# Patient Record
Sex: MALE | Race: WHITE
[De-identification: names, ages, dates, MRNs, and addresses within clinical notes are randomized per-mention and may not be internally consistent; named-entity substitution may affect disease eponyms.]

---

## 2018-10-02 ENCOUNTER — HOSPITAL ENCOUNTER (OUTPATIENT)
Dept: HOSPITAL 75 - PREOP | Age: 38
Discharge: HOME | End: 2018-10-02
Attending: SURGERY
Payer: COMMERCIAL

## 2018-10-02 VITALS — BODY MASS INDEX: 35.78 KG/M2 | WEIGHT: 270 LBS | HEIGHT: 73 IN

## 2018-10-02 DIAGNOSIS — Z01.818: Primary | ICD-10-CM

## 2018-10-04 ENCOUNTER — HOSPITAL ENCOUNTER (OUTPATIENT)
Dept: HOSPITAL 75 - SDC | Age: 38
Discharge: HOME | End: 2018-10-04
Attending: SURGERY
Payer: COMMERCIAL

## 2018-10-04 VITALS — HEIGHT: 73 IN | BODY MASS INDEX: 35.78 KG/M2 | WEIGHT: 270 LBS

## 2018-10-04 VITALS — DIASTOLIC BLOOD PRESSURE: 75 MMHG | SYSTOLIC BLOOD PRESSURE: 120 MMHG

## 2018-10-04 VITALS — DIASTOLIC BLOOD PRESSURE: 89 MMHG | SYSTOLIC BLOOD PRESSURE: 133 MMHG

## 2018-10-04 VITALS — DIASTOLIC BLOOD PRESSURE: 90 MMHG | SYSTOLIC BLOOD PRESSURE: 134 MMHG

## 2018-10-04 VITALS — DIASTOLIC BLOOD PRESSURE: 83 MMHG | SYSTOLIC BLOOD PRESSURE: 144 MMHG

## 2018-10-04 DIAGNOSIS — Z11.2: ICD-10-CM

## 2018-10-04 DIAGNOSIS — Z79.899: ICD-10-CM

## 2018-10-04 DIAGNOSIS — E66.9: ICD-10-CM

## 2018-10-04 DIAGNOSIS — K43.0: Primary | ICD-10-CM

## 2018-10-04 DIAGNOSIS — F32.9: ICD-10-CM

## 2018-10-04 DIAGNOSIS — F41.9: ICD-10-CM

## 2018-10-04 PROCEDURE — 87081 CULTURE SCREEN ONLY: CPT

## 2018-10-04 PROCEDURE — 88305 TISSUE EXAM BY PATHOLOGIST: CPT

## 2018-10-04 PROCEDURE — 94664 DEMO&/EVAL PT USE INHALER: CPT

## 2018-10-04 RX ADMIN — SODIUM CHLORIDE, SODIUM LACTATE, POTASSIUM CHLORIDE, AND CALCIUM CHLORIDE PRN MLS/HR: 600; 310; 30; 20 INJECTION, SOLUTION INTRAVENOUS at 10:11

## 2018-10-04 RX ADMIN — SODIUM CHLORIDE, SODIUM LACTATE, POTASSIUM CHLORIDE, AND CALCIUM CHLORIDE PRN MLS/HR: 600; 310; 30; 20 INJECTION, SOLUTION INTRAVENOUS at 07:25

## 2018-10-04 NOTE — OPERATIVE REPORT
DATE OF SERVICE:  10/04/2018



ATTENDING PRIMARY CLINICIAN:

STEPHANY Martinez



PREOPERATIVE DIAGNOSIS:

Symptomatic incarcerated ventral abdominal incisional hernia.



POSTOPERATIVE DIAGNOSES:

Symptomatic incarcerated ventral abdominal incisional hernia, hernia defect 3 x

3 cm in size with omentum within the hernia sac.



PROCEDURE:

Open repair of incarcerated ventral abdominal incisional hernia with mesh.



SURGEON:

Arianne Salamanca MD



ASSISTANT:

STEPHANY Coker.



ANESTHESIA:

General endotracheal.



ESTIMATED BLOOD LOSS:

Minimal.



FINDINGS:

Incisional hernia identified with a fascial defect approximately 3 x 3 cm in

size.  There was a significant size hernia sac with only omentum found within

the hernia sac.



DISPOSITION:

The patient tolerated the procedure well.



INDICATIONS:

The patient is a 38-year-old male who was referred over to us for pain and a

bulge in the supraumbilical region.  He first noticed this 2 weeks ago; however,

the lesion had grown larger in size and become much more painful.  He had a

hernia repaired in the same region, which appears to be a primary umbilical

hernia approximately in 2008.  Upon examination, he was found to have an

incarcerated incisional hernia, however, no signs of strangulation.



DESCRIPTION OF PROCEDURE:

The patient was brought to the operating room, laid supine on the table.  After

adequate IV pain and sedative medications and general endotracheal intubation,

the abdomen was prepped and draped in standard surgical fashion.



A 0.5% Marcaine with epinephrine was used to anesthetize the overlying skin

along the previous vertical incision supraumbilically.  A vertical skin incision

was made using 15 blade.  The subcutaneous tissue was then dissected down using

electrocautery.  The hernia sac was identified and completely dissected out

using electrocautery as well as Metzenbaum scissors.  The fascial defect was

identified and the area of fascia was cleared off using electrocautery as well

as blunt dissection.  The hernia sac was then opened using Metzenbaum scissors. 

There was only incarcerated omentum within the hernia sac.  This was a

significant portion of incarcerated omentum with a fascial opening significantly

smaller and approximately 3 x 3 cm.  We then proceeded with excision of the

entire hernia sac as well as the incarcerated omentum using electrocautery with

visualization of good hemostasis.



We then inspected the remainder of the fascia from the underside which was with

no other hernia defects identified as well as no adhesions.  An 8 cm coated

polypropylene mesh was then placed into the defect and sutured to the fascial

edge in a concentric manner using 3-0 Prolene sutures.  Good hemostasis was

observed.  The subcutaneous tissue was then reapproximated using 3-0 Vicryl

interrupted sutures.  Skin was closed using 4-0 Monocryl running subcuticular

suture.  Wound was then cleaned and covered with Dermabond.  The wound was then

covered with tonsil sponges followed by 4 x 4 gauze followed by a large Op-Site

and an abdominal binder.



The patient tolerated the procedure well.  We will start IV and oral pain

medications as well as a clear liquid diet.  Once he is tolerating clears and

has good pain control with oral pain medications, ambulating well, we will

discharge him home.  We will also instruct him to do no heavy lifting or

exertion for the next 2 weeks and then more in a gradual stepwise fashion,

increase activity until 6 weeks from the surgery date.





Job ID: [id number]

DocumentID: [id number]

Dictated Date:  10/04/2018 10:39:13

Transcription Date: 10/04/2018 14:08:33

Dictated By: ARIANNE SALAMANCA MD

## 2018-10-04 NOTE — XMS REPORT
Continuity of Care Document

 Created on: 10/04/2018



GIOVANNY SALAMANCA

External Reference #: [id number]

: 1980

Sex: Male



Demographics







 Address  [address]

[place]  [address]

 

 Home Phone  [phone] x

 

 Preferred Language  Unknown

 

 Marital Status  Unknown

 

 Yazdanism Affiliation  Unknown

 

 Race  Unknown

 

 Ethnic Group  Unknown





Author







 Author  [facility]

 

 Address  Unknown

 

 Phone  Unavailable



              



Allergies

      



There is no data.                  



Medications

      



There is no data.                  



Problems

      





 Date Dx Coded            Attending            Type            Code            
Diagnosis            Diagnosed By        

 

 2013            ЕЛЕНА BARRY T                         296.90     
       MOOD DISORDER                     

 

 2013            GONZALO HAMMOND ЕЛЕНА T                         726.90     
       TENDONITIS                     

 

 2013            ЕЛЕНА BARRY T                         296.90     
       MOOD DISORDER                     

 

 2013            GONZALO HAMMOND ЕЛЕНА T                         726.90     
       TENDONITIS                     

 

 2013            ЕЛЕНА BARRY T                         296.90     
       MOOD DISORDER                     

 

 2013            GONZALO HAMMOND ЕЛЕНА T                         726.90     
       TENDONITIS                     

 

 2013            ЕЛЕНА BARRY T                         296.90     
       MOOD DISORDER                     

 

 2013            GONZALO HAMMOND ЕЛЕНА T                         726.90     
       TENDONITIS                     

 

 2013            GONZALO HAMMOND ЕЛЕНА T                         296.90     
       MOOD DISORDER                     

 

 2013            GONZALO HAMMOND ЕЛЕНА T                         726.90     
       TENDONITIS                     

 

 2013            GONZALO HAMMOND ЕЛЕНА T                         296.90     
       MOOD DISORDER                     

 

 2013            GONZALO HAMMOND ЕЛЕНА T                         726.90     
       TENDONITIS                     

 

 2013            GONZALO HAMMOND ЕЛЕНА T                         296.90     
       MOOD DISORDER                     

 

 2013            ЕЛЕНА BARRY T                         726.90     
       TENDONITIS                     

 

 2013            GONZALO HAMMOND ЕЛЕНА T                         296.90     
       MOOD DISORDER                     

 

 2013            GONZALO HAMMOND ЕЛЕНА T                         726.90     
       TENDONITIS                     

 

 2013            AIDEN ORONA PHD                         296.90   
         MOOD DISORDER                     

 

 2013            AIDEN ORONA PHD                         726.90   
         TENDONITIS                     

 

 2013            ALICIA MONTOYA MD                         296.90        
    MOOD DISORDER                     

 

 2013            ALICIA MONTOYA MD                         726.90        
    TENDONITIS                     

 

 2013            LYNDON YANG MD                         296.90      
      MOOD DISORDER                     

 

 2013            LYNDON YNAG MD                         726.90      
      TENDONITIS                     

 

 2014            ЕЛЕНА BARRY T                         303.90     
       ALCOHOLISM                     

 

 2014            ЕЛЕНА BARRY                         303.90     
       ALCOHOLISM                     

 

 2014            ЕЛЕНА BARRY                         303.90     
       ALCOHOLISM                     

 

 2014            ЕЛЕНА BARRY                         303.90     
       ALCOHOLISM                     

 

 2014            ЕЛЕНА BARRY                         303.90     
       ALCOHOLISM                     

 

 2014            ADWOA BUCKNER, AIDEN ATKINSON                         303.90   
         ALCOHOLISM                     

 

 2014            ALICIA MONTOYA MD                         303.90        
    ALCOHOLISM                     

 

 2014            LYNDON YANG MD                         303.90      
      ALCOHOLISM                     

 

 2014            ЕЛЕНА BARRY                         780.57     
       SLEEP APNEA                     

 

 2014            ЕЛЕНА BARRY                         780.57     
       SLEEP APNEA                     

 

 2014            ADWOA PHD, AIDEN ATKINSON                         780.57   
         SLEEP APNEA                     

 

 2014            ALICIA MONTOYA MD                         780.57        
    SLEEP APNEA                     

 

 2014            LYNDON YANG MD                         780.57      
      SLEEP APNEA                     

 

 10/02/2014            ADWOA PHD, AIDEN ATKINSON                         300.4    
        MO DYSTHYMIC DISORDER                     

 

 10/02/2014            ADWOA PHD, AIDEN ATKINSON                         307.47   
         SI DYSSOMNIA NOS                     

 

 10/02/2014            ALICIA MONTOYA MD                         300.4         
   MO DYSTHYMIC DISORDER                     

 

 10/02/2014            ALICIA MONTOYA MD                         307.47        
    SI DYSSOMNIA NOS                     

 

 10/02/2014            LYNDON YANG MD                         300.4       
     MO DYSTHYMIC DISORDER                     

 

 10/02/2014            LYNDON YANG MD                         307.47      
      SI DYSSOMNIA NOS                     

 

 2014            ALICIA MONTOYA MD                         578.1         
   BLOOD IN STOOL                     

 

 2014            LYNDON YANG MD                         578.1       
     BLOOD IN STOOL                     

 

 01/15/2015            LYNDON YANG MD                         848.9       
     UNSPECIFIED SITE OF SPRAIN AND STRAIN                     



                                                                               
                         



Procedures

      





 Code            Description            Performed By            Performed On   
     

 

             98336                                  TSH                        
           2013        

 

             48776                                  ROUTINE VENIPUNCTURE       
                            2013        

 

             [id number]                                  CMP                        
           2013        

 

             19237                                  LIPID PANEL                
                   2013        

 

             [id number]                                  GFR CALC (RESULT ONLY)   
                                2013        

 

             [id number]                                  CBC                        
           2013        

 

             [id number]                                  SLEEP STUDY (HOSPITAL- 
SLEEP STUDY)                                   2014        

 

             [id number]                                  PSYCH DIAGNOSTIC EVALUATION
                                   10/02/2014        



                                



Results

      



There is no data.              



Encounters

      





 ACCT No.            Visit Date/Time            Discharge            Status    
        Pt. Type            Provider            Facility            Loc./Unit  
          Complaint        

 

 [id number]            01/15/2015 11:47:00            01/15/2015 23:59:59          
  [facility]            Outpatient            CELIA FLORES, LYNDON MONTES                     
                          

 

 [id number]            2014 16:35:00            2014 23:59:59          
  [facility]            Outpatient            ALICIA MONTOYA MD                       
                        

 

 [id number]            10/02/2014 08:01:00            10/02/2014 23:59:59          
  [facility]            Outpatient            ADWOA BUCKNER, AIDEN ATKINSON                  
                             

 

 [id number]            2014 12:00:00            2014 23:59:59          
  [facility]            Outpatient            [facility]                    
                           

 

 [id number]            2014 12:14:00            2014 23:59:59          
  [facility]            Outpatient            [facility]                    
                           

 

 [id number]            2014 11:39:00            2014 23:59:59          
  [facility]            Outpatient            [facility]                    
                           

 

 [id number]            2014 12:08:00            2014 23:59:59          
  [facility]            Outpatient            [facility]                    
                           

 

 [id number]            2014 10:58:00            2014 23:59:59          
  [facility]            Outpatient            [facility]                    
                           

 

 [id number]            2014 09:50:00            2014 23:59:59          
  [facility]            Outpatient            [facility]                    
                           

 

 [id number]            2013 13:53:00            2013 23:59:59          
  [facility]            Outpatient            [facility]                    
                           

 

 [id number]            2013 08:16:00            2013 23:59:59          
  [facility]            Outpatient            [facility]                    
                           

 

 [id number]            2013 18:03:25                         [facility]            
Document Registration

## 2018-10-04 NOTE — PROGRESS NOTE-POST OPERATIVE
Post-Operative Progess Note


Surgeon (s)/Assistant (s)


Surgeon


ARIANNE SALAMANCA MD


Assistant:  vidya alatorre APRN





Pre-Operative Diagnosis


Incarcerated ventral abdominal incisional hernia





Post-Operative Diagnosis





same(3x3cm) with omentum in hernia sac





Procedure & Operative Findings


Date of Procedure


10/4/18


Procedure Performed/Findings


open repair incarcerated  ventral abdominal incisional hernia with mesh.


Anesthesia Type


GET





Estimated Blood Loss


Estimated blood loss (mL):  minimal





Specimens/Packing


Specimens Removed


hernia sac and omental contents.











ARIANNE SALAMANCA MD Oct 4, 2018 10:27 am

## 2018-10-04 NOTE — DISCHARGE INST-SURGICAL
D/C Lap Instructions-KIDO


New, Converted, or Re-Newed RX:  RX on Chart


Follow Up Appt in 2 weeks





Activity as tolerated


No driving for 24 hours


No driving while on pain medications


No heavy lifting/exertion for 6 weeks.





Incentive Spirometry use every 2 hours while awake





Regular Diet





Symptoms to Report: Fever over 101 degree F, Nausea/Vomiting 


Infection Signs and Symptoms to report:  Increased redness, Foul odor of wound, 

Increased drainage





Bathing instructions: May shower


Operative Area Clean/Dry;  Keep incision clean/dry





If any problems/questions: Contact your physician or go to Emergency Room











JAYY MOJICA Oct 4, 2018 8:14 am

## 2018-10-04 NOTE — PROGRESS NOTE-PRE OPERATIVE
Pre-Operative Progress Note


H&P Reviewed


The H&P was reviewed, patient examined and no changes noted.


Date Seen by Provider:  Oct 4, 2018


Time Seen by Provider:  07:55


Date H&P Reviewed:  Oct 4, 2018


Time H&P Reviewed:  08:00


Pre-Operative Diagnosis:  Incarcerated recurrent ventral abdominal incisional 

hernia











JAYY MOJICA Oct 4, 2018 8:09 am

## 2018-10-04 NOTE — ANESTHESIA-GENERAL POST-OP
General


Patient Condition


Mental Status/LOC:  Same as Preop


Cardiovascular:  Satisfactory


Nausea/Vomiting:  Absent


Respiratory:  Satisfactory


Pain:  Controlled


Complications:  Absent





Post Op Complications


Complications


None





Follow Up Care/Instructions


Patient Instructions


None needed.





Anesthesia/Patient Condition


Patient Condition


Patient is doing well, no complaints, stable vital signs, no apparent adverse 

anesthesia problems.   


No complications reported per nursing.











JONATHAN GARCIA CRNA Oct 4, 2018 12:15

## 2022-01-06 ENCOUNTER — HOSPITAL ENCOUNTER (OUTPATIENT)
Dept: HOSPITAL 75 - RAD | Age: 42
End: 2022-01-06
Attending: NURSE PRACTITIONER
Payer: COMMERCIAL

## 2022-01-06 DIAGNOSIS — R60.0: ICD-10-CM

## 2022-01-06 DIAGNOSIS — R23.0: Primary | ICD-10-CM

## 2022-01-06 PROCEDURE — 93922 UPR/L XTREMITY ART 2 LEVELS: CPT

## 2022-01-06 NOTE — DIAGNOSTIC IMAGING REPORT
Noninvasive lower arterial Doppler 



INDICATION: Lower extremity cyanosis



Routine images of the lower extremities were obtained. The ankle

brachial index on the right is 1.32 and on the  left is 1.28

(normal 1.0 or greater).



IMPRESSION: 

1. The ankle brachial indices are within normal limits. There

appears to be fairly good arterial blood flow to both lower

studies.

2. If further study is desired, then dedicated arterial Doppler

ultrasound examination of both exams would be recommended.



Dictated by: 



  Dictated on workstation # [id number]

## 2022-01-11 ENCOUNTER — HOSPITAL ENCOUNTER (OUTPATIENT)
Dept: HOSPITAL 75 - RAD | Age: 42
End: 2022-01-11
Attending: NURSE PRACTITIONER
Payer: COMMERCIAL

## 2022-01-11 DIAGNOSIS — R09.89: ICD-10-CM

## 2022-01-11 DIAGNOSIS — R23.0: Primary | ICD-10-CM

## 2022-01-11 PROCEDURE — 93970 EXTREMITY STUDY: CPT

## 2022-01-11 NOTE — DIAGNOSTIC IMAGING REPORT
PROCEDURE: 

US Venous Lower Ext Glenn.



TECHNIQUE: 

Multiple Real-time grayscale images were obtained over the lower

extremities in various projections bilaterally. Additional duplex

Doppler and color Doppler images were also obtained.



INDICATION: 

Cyanosis and poor capillary refill.



FINDINGS:

There is no evidence of right or left lower extremity DVT. Both

lower extremity deep venous systems demonstrate normal

compressibility with normal response to augmentation and

Valsalva. No fluid collection or mass is detected.



IMPRESSION: 

No evidence of right or left lower extremity DVT.



Dictated by: 



  Dictated on workstation # [id number]